# Patient Record
Sex: MALE | Race: WHITE | NOT HISPANIC OR LATINO
[De-identification: names, ages, dates, MRNs, and addresses within clinical notes are randomized per-mention and may not be internally consistent; named-entity substitution may affect disease eponyms.]

---

## 2019-09-17 ENCOUNTER — APPOINTMENT (OUTPATIENT)
Dept: ORTHOPEDIC SURGERY | Facility: CLINIC | Age: 39
End: 2019-09-17
Payer: COMMERCIAL

## 2019-09-17 VITALS
HEART RATE: 60 BPM | BODY MASS INDEX: 29.16 KG/M2 | HEIGHT: 73 IN | DIASTOLIC BLOOD PRESSURE: 86 MMHG | SYSTOLIC BLOOD PRESSURE: 138 MMHG | WEIGHT: 220 LBS

## 2019-09-17 DIAGNOSIS — Z80.42 FAMILY HISTORY OF MALIGNANT NEOPLASM OF PROSTATE: ICD-10-CM

## 2019-09-17 PROCEDURE — 73030 X-RAY EXAM OF SHOULDER: CPT | Mod: TC,LT

## 2019-09-17 PROCEDURE — 99204 OFFICE O/P NEW MOD 45 MIN: CPT

## 2019-09-17 NOTE — HISTORY OF PRESENT ILLNESS
[FreeTextEntry1] : 09/17/2019: Patient is a 38 year old, right-hand-dominant male who presents to the office today 3 days s/p shoulder dislocation. He states that he was lifting weights over his head in a competition and his left shoulder dislocated. He was taken to [facility] ED by ambulance and was closed reduced in the ED. He was placed in a shoulder sling and presents today for further treatment. His pain has improved since then and he denies any further dislocations or feeling of instability. He notes that the entire shoulder just feels "sore and irritated" and doesn't complain specifically of pain. He denies at any time since the incident any paresthesias.

## 2019-09-17 NOTE — PHYSICAL EXAM
[Normal] : Alert and in no acute distress [de-identified] : Left Shoulder Exam\par \par Skin intact with no erythema or ecchymosis. There are no gross deformities. Shoulder ROM limited secondary to pain/stiffness. Elbow, wrist and digit range of motion is fully intact without pain or deficits. Axillary/AIN/PIN/Med/Rad/Uln nn. intact to motor and sensory. Distal pulses 2+. [de-identified] : 4 xray views of the left shoulder were obtained. There are no fractures or dislocations noted.

## 2019-09-17 NOTE — ASSESSMENT
[FreeTextEntry1] : The patient is 3 days s/p dislocated left shoulder. The nature of this condition was described at length with the patient and he verbalized understanding. He will continue the next 4 weeks with a sling at all times. He will only take off 2-3 times daily to work on pendulum exercises (demonstrated for patient today). He will return to the office in 4 weeks for repeat xrays and re-evaluation. The patient is in agreement with this plan.

## 2019-09-25 ENCOUNTER — APPOINTMENT (OUTPATIENT)
Dept: ORTHOPEDIC SURGERY | Facility: CLINIC | Age: 39
End: 2019-09-25
Payer: COMMERCIAL

## 2019-09-25 PROCEDURE — 99212 OFFICE O/P EST SF 10 MIN: CPT

## 2019-09-25 NOTE — HISTORY OF PRESENT ILLNESS
[FreeTextEntry1] : 09/17/2019: Patient is a 38 year old, right-hand-dominant male who presents to the office today 3 days s/p shoulder dislocation. He states that he was lifting weights over his head in a competition and his left shoulder dislocated. He was taken to [facility] ED by ambulance and was closed reduced in the ED. He was placed in a shoulder sling and presents today for further treatment. His pain has improved since then and he denies any further dislocations or feeling of instability. He notes that the entire shoulder just feels "sore and irritated" and doesn't complain specifically of pain. He denies at any time since the incident any paresthesias.\par \par 9/25/19: the patient returns today for followup of his left shoulder dislocation. He is comfortable in the sling and has been working on gentle pendulum exercises.He wishes to return to work next week for light duty.

## 2019-09-25 NOTE — ASSESSMENT
[FreeTextEntry1] : The patient is  week and a half s/p losed reduction of a dislocated left shoulder. The nature of this condition was described at length with the patient and he verbalized understanding. He will continue the next 4 weeks with slingduring activity, he will remove the sling daily for range of motion exercises. He may return to work next week for light duty, he was weight lifting with the left upper extremity. He'll follow up in 4 weeks for repeat x-rays and evaluation.

## 2019-09-25 NOTE — PHYSICAL EXAM
[Normal] : Oriented to person, place, and time, insight and judgement were intact and the affect was normal [de-identified] : Left Shoulder Exam\par \par Skin intact with no erythema or ecchymosis. There are no gross deformities. Shoulder ROM limited secondary to pain/stiffness. Elbow, wrist and digit range of motion is fully intact without pain or deficits. Axillary/AIN/PIN/Med/Rad/Uln nn. intact to motor and sensory. Distal pulses 2+.

## 2019-11-01 ENCOUNTER — APPOINTMENT (OUTPATIENT)
Dept: ORTHOPEDIC SURGERY | Facility: CLINIC | Age: 39
End: 2019-11-01
Payer: COMMERCIAL

## 2019-11-01 PROCEDURE — 99213 OFFICE O/P EST LOW 20 MIN: CPT

## 2019-11-01 NOTE — HISTORY OF PRESENT ILLNESS
[FreeTextEntry1] : 09/17/2019: Patient is a 38 year old, right-hand-dominant male who presents to the office today 3 days s/p shoulder dislocation. He states that he was lifting weights over his head in a competition and his left shoulder dislocated. He was taken to [facility] ED by ambulance and was closed reduced in the ED. He was placed in a shoulder sling and presents today for further treatment. His pain has improved since then and he denies any further dislocations or feeling of instability. He notes that the entire shoulder just feels "sore and irritated" and doesn't complain specifically of pain. He denies at any time since the incident any paresthesias.\par \par 9/25/19: the patient returns today for followup of his left shoulder dislocation. He is comfortable in the sling and has been working on gentle pendulum exercises.He wishes to return to work next week for light duty.\par \par 11/01/2019: The patient presents to the office for repeat evaluation of his left shoulder. He is now about 4 weeks removed from the initial dislocation of his left shoulder. He has returned to work light duty. He just discontinued use of the shoulder immobilizer about one week ago. He has been working with gentle pendulum exercises and doing well. He denies any pain.

## 2019-11-01 NOTE — ASSESSMENT
[FreeTextEntry1] : Patient is about 4 weeks removed from a dislocation of the left shoulder. He has been compliant with the mobilization up until about one week ago. He is back to light duty at work. I recommend a course of physical therapy to work on range of motion exercises. He will continue light duty at work and follow back up in 4 weeks for repeat evaluation. The patient is in agreement with this plan.

## 2019-11-01 NOTE — PHYSICAL EXAM
[Normal Gait/Station] : Gait and station are normal [Normal RUE] : Right Upper Extremity: No scars, rashes, lesions, ulcers, skin intact [Normal Touch] : sensation intact for  touch [Normal] : No costovertebral angle tenderness and no spinal tenderness [de-identified] : Left Shoulder Exam\par \par Skin intact with no erythema or ecchymosis. There are no gross deformities. Shoulder ROM limited secondary to pain/stiffness. Elbow, wrist and digit range of motion is fully intact without pain or deficits. Axillary/AIN/PIN/Med/Rad/Uln nn. intact to motor and sensory. Distal pulses 2+. Mild apprehension with internal rotation [de-identified] : KWANR

## 2019-11-27 ENCOUNTER — APPOINTMENT (OUTPATIENT)
Dept: ORTHOPEDIC SURGERY | Facility: CLINIC | Age: 39
End: 2019-11-27

## 2019-11-27 DIAGNOSIS — S43.005D UNSPECIFIED DISLOCATION OF LEFT SHOULDER JOINT, SUBSEQUENT ENCOUNTER: ICD-10-CM

## 2020-03-03 ENCOUNTER — APPOINTMENT (OUTPATIENT)
Dept: UROLOGY | Facility: CLINIC | Age: 40
End: 2020-03-03
Payer: COMMERCIAL

## 2020-03-03 VITALS
WEIGHT: 225 LBS | BODY MASS INDEX: 29.82 KG/M2 | HEIGHT: 73 IN | SYSTOLIC BLOOD PRESSURE: 130 MMHG | DIASTOLIC BLOOD PRESSURE: 80 MMHG | HEART RATE: 59 BPM | OXYGEN SATURATION: 97 %

## 2020-03-03 DIAGNOSIS — J45.909 UNSPECIFIED ASTHMA, UNCOMPLICATED: ICD-10-CM

## 2020-03-03 PROCEDURE — 99204 OFFICE O/P NEW MOD 45 MIN: CPT

## 2020-03-03 NOTE — HISTORY OF PRESENT ILLNESS
[FreeTextEntry1] : Patient presents today with his wife.  He is beginning to develop some lower tract symptoms with post void dribbling, nocturia, and occasional frequency.  He also has a strong family history for prostate cancer with a father and uncle who both had it.  He also has noted a decrease in erectile function over the past year or so.  He has some difficulty attaining erections in the first place and then difficulty sustaining erections.

## 2020-03-03 NOTE — END OF VISIT
[FreeTextEntry3] : Labs are sent and he will follow-up with a transrectal ultrasound of the prostate gland and a duplex scan of the penis with plans to follow

## 2020-03-03 NOTE — PHYSICAL EXAM
[General Appearance - Well Developed] : well developed [General Appearance - Well Nourished] : well nourished [Normal Appearance] : normal appearance [Well Groomed] : well groomed [General Appearance - In No Acute Distress] : no acute distress [Edema] : no peripheral edema [Respiration, Rhythm And Depth] : normal respiratory rhythm and effort [Exaggerated Use Of Accessory Muscles For Inspiration] : no accessory muscle use [Abdomen Tenderness] : non-tender [Abdomen Soft] : soft [Urethral Meatus] : meatus normal [Costovertebral Angle Tenderness] : no ~M costovertebral angle tenderness [Scrotum] : the scrotum was normal [Urinary Bladder Findings] : the bladder was normal on palpation [Testes Mass (___cm)] : there were no testicular masses [FreeTextEntry1] : lpably benign, small prostate gland [No Prostate Nodules] : no prostate nodules [] : no rash [Normal Station and Gait] : the gait and station were normal for the patient's age [Affect] : the affect was normal [No Focal Deficits] : no focal deficits [Oriented To Time, Place, And Person] : oriented to person, place, and time [Mood] : the mood was normal [Not Anxious] : not anxious [No Palpable Adenopathy] : no palpable adenopathy

## 2020-03-03 NOTE — REVIEW OF SYSTEMS
[Loss of interest] : loss of interest in sexual activity [Wake up at night to urinate  How many times?  ___] : wakes up to urinate [unfilled] times during the night [Poor quality erections] : Poor quality erections [Strong urge to urinate] : strong urge to urinate [Wait a long time to urinate] : waits a long time to urinate [Interrupted urine stream] : interrupted urine stream [Bladder fullness after urinating] : bladder fullness after urinating [Negative] : Heme/Lymph [see HPI] : see HPI

## 2020-03-12 LAB
APPEARANCE: CLEAR
BACTERIA: NEGATIVE
BILIRUBIN URINE: NEGATIVE
BLOOD URINE: NEGATIVE
COLOR: YELLOW
GLUCOSE QUALITATIVE U: NEGATIVE
HYALINE CASTS: 0 /LPF
KETONES URINE: NEGATIVE
LEUKOCYTE ESTERASE URINE: NEGATIVE
MICROSCOPIC-UA: NORMAL
NITRITE URINE: NEGATIVE
PH URINE: 6
PROTEIN URINE: NORMAL
PSA SERPL-MCNC: 2.34 NG/ML
RED BLOOD CELLS URINE: 3 /HPF
SPECIFIC GRAVITY URINE: 1.03
SQUAMOUS EPITHELIAL CELLS: 1 /HPF
UROBILINOGEN URINE: NORMAL
WHITE BLOOD CELLS URINE: 1 /HPF

## 2020-03-13 LAB — TESTOST SERPL-MCNC: 434 NG/DL

## 2020-03-16 ENCOUNTER — APPOINTMENT (OUTPATIENT)
Dept: UROLOGY | Facility: CLINIC | Age: 40
End: 2020-03-16
Payer: COMMERCIAL

## 2020-03-16 VITALS
HEIGHT: 73 IN | DIASTOLIC BLOOD PRESSURE: 87 MMHG | OXYGEN SATURATION: 96 % | WEIGHT: 225 LBS | HEART RATE: 81 BPM | SYSTOLIC BLOOD PRESSURE: 156 MMHG | BODY MASS INDEX: 29.82 KG/M2

## 2020-03-16 PROCEDURE — 76872 US TRANSRECTAL: CPT

## 2020-03-16 PROCEDURE — 51741 ELECTRO-UROFLOWMETRY FIRST: CPT

## 2020-03-16 PROCEDURE — 76857 US EXAM PELVIC LIMITED: CPT

## 2020-03-17 LAB — URINE CYTOLOGY: NORMAL

## 2020-04-27 ENCOUNTER — APPOINTMENT (OUTPATIENT)
Dept: UROLOGY | Facility: CLINIC | Age: 40
End: 2020-04-27

## 2020-06-02 ENCOUNTER — APPOINTMENT (OUTPATIENT)
Dept: UROLOGY | Facility: CLINIC | Age: 40
End: 2020-06-02
Payer: COMMERCIAL

## 2020-06-02 VITALS
SYSTOLIC BLOOD PRESSURE: 143 MMHG | DIASTOLIC BLOOD PRESSURE: 90 MMHG | HEART RATE: 70 BPM | HEIGHT: 73 IN | TEMPERATURE: 98.2 F | WEIGHT: 225 LBS | BODY MASS INDEX: 29.82 KG/M2 | OXYGEN SATURATION: 96 %

## 2020-06-02 PROCEDURE — 93980 PENILE VASCULAR STUDY: CPT

## 2020-06-02 PROCEDURE — 54235 NJX CORPORA CAVERNOSA RX AGT: CPT

## 2020-11-24 ENCOUNTER — OUTPATIENT (OUTPATIENT)
Dept: OUTPATIENT SERVICES | Facility: HOSPITAL | Age: 40
LOS: 1 days | End: 2020-11-24
Payer: COMMERCIAL

## 2020-11-24 DIAGNOSIS — Z11.59 ENCOUNTER FOR SCREENING FOR OTHER VIRAL DISEASES: ICD-10-CM

## 2020-11-24 LAB — SARS-COV-2 RNA SPEC QL NAA+PROBE: DETECTED

## 2020-11-24 PROCEDURE — U0003: CPT

## 2020-11-25 DIAGNOSIS — Z11.59 ENCOUNTER FOR SCREENING FOR OTHER VIRAL DISEASES: ICD-10-CM

## 2021-09-02 ENCOUNTER — RX RENEWAL (OUTPATIENT)
Age: 41
End: 2021-09-02

## 2021-09-07 ENCOUNTER — APPOINTMENT (OUTPATIENT)
Dept: UROLOGY | Facility: CLINIC | Age: 41
End: 2021-09-07
Payer: COMMERCIAL

## 2021-09-07 DIAGNOSIS — N40.1 BENIGN PROSTATIC HYPERPLASIA WITH LOWER URINARY TRACT SYMPMS: ICD-10-CM

## 2021-09-07 DIAGNOSIS — N13.8 BENIGN PROSTATIC HYPERPLASIA WITH LOWER URINARY TRACT SYMPMS: ICD-10-CM

## 2021-09-07 DIAGNOSIS — N52.9 MALE ERECTILE DYSFUNCTION, UNSPECIFIED: ICD-10-CM

## 2021-09-07 PROCEDURE — 99213 OFFICE O/P EST LOW 20 MIN: CPT

## 2021-09-07 RX ORDER — SILDENAFIL 20 MG/1
20 TABLET ORAL
Qty: 90 | Refills: 0 | Status: ACTIVE | COMMUNITY
Start: 2020-06-02 | End: 1900-01-01

## 2021-09-07 RX ORDER — TAMSULOSIN HYDROCHLORIDE 0.4 MG/1
0.4 CAPSULE ORAL
Qty: 90 | Refills: 3 | Status: ACTIVE | COMMUNITY
Start: 2020-03-16 | End: 1900-01-01

## 2023-05-18 ENCOUNTER — EMERGENCY (EMERGENCY)
Facility: HOSPITAL | Age: 43
LOS: 1 days | Discharge: HOME | End: 2023-05-18
Attending: EMERGENCY MEDICINE | Admitting: EMERGENCY MEDICINE
Payer: COMMERCIAL

## 2023-05-18 VITALS
HEART RATE: 68 BPM | HEIGHT: 73 IN | OXYGEN SATURATION: 97 % | TEMPERATURE: 98 F | RESPIRATION RATE: 16 BRPM | WEIGHT: 220.02 LBS | DIASTOLIC BLOOD PRESSURE: 95 MMHG | SYSTOLIC BLOOD PRESSURE: 151 MMHG

## 2023-05-18 VITALS
OXYGEN SATURATION: 98 % | RESPIRATION RATE: 16 BRPM | DIASTOLIC BLOOD PRESSURE: 84 MMHG | SYSTOLIC BLOOD PRESSURE: 130 MMHG | HEART RATE: 64 BPM

## 2023-05-18 PROCEDURE — 73030 X-RAY EXAM OF SHOULDER: CPT

## 2023-05-18 PROCEDURE — 99283 EMERGENCY DEPT VISIT LOW MDM: CPT | Mod: 25

## 2023-05-18 PROCEDURE — 73030 X-RAY EXAM OF SHOULDER: CPT | Mod: 26,LT

## 2023-05-18 PROCEDURE — 99284 EMERGENCY DEPT VISIT MOD MDM: CPT | Mod: 25

## 2023-05-18 PROCEDURE — 23650 CLTX SHO DSLC W/MNPJ WO ANES: CPT | Mod: LT

## 2023-05-18 PROCEDURE — 99283 EMERGENCY DEPT VISIT LOW MDM: CPT

## 2023-05-18 NOTE — ED ADULT NURSE NOTE - PRO INTERPRETER NEED 2
Continue current lovastatin treatment.  
Continue current treatment.  
Continue pantoprazole treatment.  
Has been well controlled for some time. No treatment recommendations at this time.  
Stable. Continue to abstain from NSAIDs OTC for pain.  
English

## 2023-05-18 NOTE — ED PROVIDER NOTE - PATIENT PORTAL LINK FT
You can access the FollowMyHealth Patient Portal offered by [facility] by registering at the following website: [email or web address]. By joining [employer]’s FollowMyHealth portal, you will also be able to view your health information using other applications (apps) compatible with our system.

## 2023-05-18 NOTE — ED PROVIDER NOTE - CLINICAL SUMMARY MEDICAL DECISION MAKING FREE TEXT BOX
Patient is a 42-year-old male who presents to the emergency room with a chief complaint of shoulder dislocation.  Reports that he was lifting weights at the gym prior to arrival when he felt his left shoulder dislocate.  Does have a previous history of shoulder dislocation several years ago in the same shoulder.  Reports he did PT following the injury but did not follow with an orthopedist and he does not believe he had an MRI of the shoulder.  Reports pain to the shoulder but denies numbness or tingling to the arm.  Patient is right-hand dominant.  Denies additional injury. Presenting to the emergency room with left shoulder dislocation neurovascularly intact.  Will obtain x-rays and attempt relocation. X-rays reviewed no acute fracture patient does appear to have a dislocated the left shoulder.  Using the Cunningham technique an attempt was made to reduce the shoulder.  During the procedure patient became lightheaded was assisted back to bed where he had a vagal episode lasting approximately 10 seconds during which time shoulder was reduced.  Repeat x-rays were obtained reduction successful no acute fracture noted.  Patient remains neurovascular intact was placed in sling observed for some time with stable vital signs.  Can be discharged home.  Advised to remain in the sling until outpatient orthopedic follow-up.  All questions answered stable for discharge at this time.

## 2023-05-18 NOTE — ED PROVIDER NOTE - DIFFERENTIAL DIAGNOSIS
Differential Diagnosis Presenting to the emergency room with left shoulder dislocation neurovascularly intact.  Will obtain x-rays and attempt relocation.

## 2023-05-18 NOTE — ED PROVIDER NOTE - NSFOLLOWUPINSTRUCTIONS_ED_ALL_ED_FT
Return to the ED for any new or worsening symptoms  Take your medication as prescribed  Sling to arm until cleared by orthopedics  Call the orthopedist tomorrow to schedule follow up   Advance activity as tolerated      Shoulder Dislocation  Three images of the anatomy of the shoulder. One is normal. The other two are dislocated.  A shoulder dislocation happens when the upper arm bone (humerus) moves out of the shoulder joint. The shoulder joint is the part of the shoulder where the humerus, shoulder blade (scapula), and collarbone (clavicle) meet.    What are the causes?  This condition is often caused by:  A fall.  A hard, direct hit to the shoulder.  A forceful movement of the shoulder.  What increases the risk?  You are more likely to develop this condition if you play sports.    What are the signs or symptoms?  The upper body showing a dislocated shoulder.  Symptoms of this condition include:  Deformity of the shoulder.  Severe pain.  Inability to move the shoulder.  Numbness, weakness, or tingling in your neck or down your arm.  Bruising or swelling around your shoulder.  How is this diagnosed?  This condition is diagnosed with a physical exam. After the exam, tests may be done to check for related problems. Tests may include:  X-ray. This may be done to check for broken bones.  MRI. This may be done to check for damage to the tissues around the shoulder.  Electromyogram. This may be done to check for nerve damage.  How is this treated?  This condition is treated with a procedure to place the humerus back in the joint. This procedure is called a reduction. There are two types of reduction:  Closed reduction. The humerus is placed back in the joint without surgery. The health care provider uses his or her hands to guide the bone back into place.  Open reduction. Surgery is done to place the humerus back in the joint. An open reduction may be recommended if:  You have a weak shoulder joint or weak ligaments.  You have had more than one shoulder dislocation.  The nerves or blood vessels around your shoulder have been damaged.  After reduction, your shoulder and arm will be placed in a brace or sling to prevent it from moving.    After the brace or sling is removed, you will have physical therapy to help improve the range of motion in your shoulder joint.    Follow these instructions at home:  Medicines    Take over-the-counter and prescription medicines only as told by your health care provider.  Ask your health care provider if the medicine prescribed to you:  Requires you to avoid driving or using machinery.  Can cause constipation. You may need to take these actions to prevent or treat constipation:  Drink enough fluid to keep your urine pale yellow.  Take over-the-counter or prescription medicines.  Eat foods that are high in fiber, such as beans, whole grains, and fresh fruits and vegetables.  Limit foods that are high in fat and processed sugars, such as fried or sweet foods.  If you have a brace or sling:    Wear the brace or sling as told by your health care provider. Remove it only as told by your health care provider.  Loosen the brace or sling if your fingers tingle, become numb, or turn cold and blue.  Keep the brace or sling clean.  If the brace or sling is not waterproof:  Do not let it get wet.  Cover it with a watertight covering when you take a bath or shower.  Managing pain, stiffness, and swelling    Bag of ice on a towel on the skin.  If directed, put ice on the injured area.  If you have a removable brace or sling, remove it as told by your health care provider.  Put ice in a plastic bag.  Place a towel between your skin and the bag.  Leave the ice on for 20 minutes, 2–3 times per day.  Move your fingers often to reduce stiffness and swelling.  Raise (elevate) the injured area above the level of your heart while you are sitting or lying down.  Activity    Do not lift your arm above shoulder level until your health care provider approves.  Do not lift anything until your health care provider says that it is safe.  Do not push or pull things until your health care provider approves.  Return to your normal activities as told by your health care provider. Ask your health care provider what activities are safe for you.  Perform range-of-motion exercises only as told by your health care provider.  Exercise your hand by squeezing a soft ball. This helps to decrease stiffness and swelling in your hand and wrist.  General instructions    Do not drive while wearing a brace or sling on a hand that you use for driving. Ask your health care provider when it is safe to drive after the brace or sling is removed.  Do not take baths, swim, or use a hot tub until your health care provider approves. Ask your health care provider if you may take showers. You may only be allowed to take sponge baths.  Do not use any products that contain nicotine or tobacco, such as cigarettes, e-cigarettes, and chewing tobacco. These can delay healing. If you need help quitting, ask your health care provider.  Keep all follow-up visits as told by your health care provider. This is important.  Contact a health care provider if:  Your brace or sling gets damaged.  Get help right away if:  Your pain gets worse rather than better.  You lose feeling in your arm or hand.  Your arm or hand becomes white and cold.  Summary  A shoulder dislocation happens when the upper arm bone moves out of the shoulder joint.  It is usually caused by a fall, a strong hit to the shoulder, or a forceful movement of the shoulder.  It causes severe pain, inability to move the shoulder, numbness, weakness, or tingling.  This condition is treated with either closed or open reduction. You will also be given a brace or sling. You will do exercises to increase your shoulder's range of motion.  Contact a health care provider if your brace or sling gets damaged. Get help right away if your pain gets worse, you lose feeling in your arm or hand, or your arm or hand becomes white or cold.  This information is not intended to replace advice given to you by your health care provider. Make sure you discuss any questions you have with your health care provider.

## 2023-05-18 NOTE — ED ADULT NURSE NOTE - NSFALLUNIVINTERV_ED_ALL_ED
Bed/Stretcher in lowest position, wheels locked, appropriate side rails in place/Call bell, personal items and telephone in reach/Instruct patient to call for assistance before getting out of bed/chair/stretcher/Non-slip footwear applied when patient is off stretcher/[place] to call system/Physically safe environment - no spills, clutter or unnecessary equipment/Purposeful proactive rounding/Room/bathroom lighting operational, light cord in reach

## 2023-05-18 NOTE — ED PROVIDER NOTE - CARE PROVIDER_API CALL
Edgar Griggs)  Orthopaedic Surgery  [address] [place] [address]  Phone: [phone]  Fax: [phone]  Follow Up Time:

## 2023-05-18 NOTE — ED PROVIDER NOTE - OBJECTIVE STATEMENT
Patient is a 42-year-old male who presents to the emergency room with a chief complaint of shoulder dislocation.  Reports that he was lifting weights at the gym prior to arrival when he felt his left shoulder dislocate.  Does have a previous history of shoulder dislocation several years ago in the same shoulder.  Reports he did PT following the injury but did not follow with an orthopedist and he does not believe he had an MRI of the shoulder.  Reports pain to the shoulder but denies numbness or tingling to the arm.  Patient is right-hand dominant.  Denies additional injury.

## 2023-05-18 NOTE — ED ADULT NURSE NOTE - OBJECTIVE STATEMENT
Otherwise healthy 42 year old male presents from home for left shoulder pain.  Patient states, "I dislocated my left shoulder at crossfit."  Endorses 5/10 pain and limited ROM.  Denies other symptoms.

## 2024-09-03 NOTE — ED ADULT NURSE NOTE - LATERALITY
left What Type Of Note Output Would You Prefer (Optional)?: Bullet Format Hpi Title: Evaluation of Skin Lesions